# Patient Record
Sex: FEMALE | Race: WHITE | NOT HISPANIC OR LATINO | Employment: UNEMPLOYED | URBAN - METROPOLITAN AREA
[De-identification: names, ages, dates, MRNs, and addresses within clinical notes are randomized per-mention and may not be internally consistent; named-entity substitution may affect disease eponyms.]

---

## 2023-04-05 ENCOUNTER — HOSPITAL ENCOUNTER (OUTPATIENT)
Dept: RADIOLOGY | Facility: HOSPITAL | Age: 58
Discharge: HOME | End: 2023-04-05
Attending: NURSE PRACTITIONER

## 2023-04-05 DIAGNOSIS — M25.511 PAIN IN RIGHT SHOULDER: ICD-10-CM

## 2023-04-05 PROCEDURE — 73221 MRI JOINT UPR EXTREM W/O DYE: CPT | Mod: 26,RT,, | Performed by: RADIOLOGY

## 2023-04-05 PROCEDURE — 73221 MRI JOINT UPR EXTREM W/O DYE: CPT | Mod: TC,RT

## 2023-04-05 PROCEDURE — 73221 MRI SHOULDER WITHOUT CONTRAST RIGHT: ICD-10-PCS | Mod: 26,RT,, | Performed by: RADIOLOGY

## 2023-04-21 DIAGNOSIS — M25.511 ACUTE PAIN OF RIGHT SHOULDER: Primary | ICD-10-CM

## 2023-05-04 ENCOUNTER — HOSPITAL ENCOUNTER (OUTPATIENT)
Dept: RADIOLOGY | Facility: HOSPITAL | Age: 58
Discharge: HOME | End: 2023-05-04
Attending: ORTHOPAEDIC SURGERY

## 2023-05-04 ENCOUNTER — OFFICE VISIT (OUTPATIENT)
Dept: ORTHOPEDICS | Facility: CLINIC | Age: 58
End: 2023-05-04

## 2023-05-04 VITALS — RESPIRATION RATE: 16 BRPM | HEIGHT: 64 IN | WEIGHT: 208 LBS | BODY MASS INDEX: 35.51 KG/M2

## 2023-05-04 DIAGNOSIS — M25.511 ACUTE PAIN OF RIGHT SHOULDER: ICD-10-CM

## 2023-05-04 DIAGNOSIS — M25.511 ACUTE PAIN OF RIGHT SHOULDER: Primary | ICD-10-CM

## 2023-05-04 PROCEDURE — 99204 OFFICE O/P NEW MOD 45 MIN: CPT | Mod: S$PBB,25,, | Performed by: ORTHOPAEDIC SURGERY

## 2023-05-04 PROCEDURE — 20610 DRAIN/INJ JOINT/BURSA W/O US: CPT | Mod: PBBFAC,PN,RT | Performed by: ORTHOPAEDIC SURGERY

## 2023-05-04 PROCEDURE — 73030 X-RAY EXAM OF SHOULDER: CPT | Mod: TC,PN,RT

## 2023-05-04 PROCEDURE — 99213 OFFICE O/P EST LOW 20 MIN: CPT | Mod: PBBFAC,PN,25 | Performed by: ORTHOPAEDIC SURGERY

## 2023-05-04 PROCEDURE — 99204 PR OFFICE/OUTPT VISIT, NEW, LEVL IV, 45-59 MIN: ICD-10-PCS | Mod: S$PBB,25,, | Performed by: ORTHOPAEDIC SURGERY

## 2023-05-04 PROCEDURE — 73030 XR SHOULDER TRAUMA 3 VIEW RIGHT: ICD-10-PCS | Mod: 26,RT,, | Performed by: RADIOLOGY

## 2023-05-04 PROCEDURE — 99999 PR PBB SHADOW E&M-EST. PATIENT-LVL III: ICD-10-PCS | Mod: PBBFAC,,, | Performed by: ORTHOPAEDIC SURGERY

## 2023-05-04 PROCEDURE — 73030 X-RAY EXAM OF SHOULDER: CPT | Mod: 26,RT,, | Performed by: RADIOLOGY

## 2023-05-04 PROCEDURE — 20610 LARGE JOINT ASPIRATION/INJECTION: R SUBACROMIAL BURSA: ICD-10-PCS | Mod: S$PBB,RT,, | Performed by: ORTHOPAEDIC SURGERY

## 2023-05-04 PROCEDURE — 99999 PR PBB SHADOW E&M-EST. PATIENT-LVL III: CPT | Mod: PBBFAC,,, | Performed by: ORTHOPAEDIC SURGERY

## 2023-05-04 RX ORDER — MELOXICAM 15 MG/1
TABLET ORAL
COMMUNITY
Start: 2023-03-23

## 2023-05-04 RX ORDER — CHLORTHALIDONE 25 MG/1
25 TABLET ORAL
COMMUNITY
Start: 2023-04-05

## 2023-05-04 RX ORDER — SERTRALINE HYDROCHLORIDE 50 MG/1
50 TABLET, FILM COATED ORAL
COMMUNITY
Start: 2023-04-05

## 2023-05-04 RX ORDER — HYDROXYZINE HYDROCHLORIDE 50 MG/1
50 TABLET, FILM COATED ORAL NIGHTLY
COMMUNITY
Start: 2023-04-05

## 2023-05-04 RX ORDER — METHOCARBAMOL 750 MG/1
750 TABLET, FILM COATED ORAL EVERY 8 HOURS PRN
COMMUNITY
Start: 2023-04-05

## 2023-05-04 RX ORDER — TRIAMCINOLONE ACETONIDE 40 MG/ML
40 INJECTION, SUSPENSION INTRA-ARTICULAR; INTRAMUSCULAR
Status: DISCONTINUED | OUTPATIENT
Start: 2023-05-04 | End: 2023-05-04 | Stop reason: HOSPADM

## 2023-05-04 RX ADMIN — TRIAMCINOLONE ACETONIDE 40 MG: 40 INJECTION, SUSPENSION INTRA-ARTICULAR; INTRAMUSCULAR at 08:05

## 2023-05-04 NOTE — PROCEDURES
Large Joint Aspiration/Injection: R subacromial bursa    Date/Time: 5/4/2023 8:30 AM  Performed by: Golden Salinas MD  Authorized by: Golden Salinas MD     Consent Done?:  Yes (Verbal)  Indications:  Pain  Site marked: the procedure site was marked    Timeout: prior to procedure the correct patient, procedure, and site was verified    Local anesthetic: Ropivicaine.  Anesthetic total (ml):  3      Details:  Needle Size:  20 G  Ultrasonic Guidance for needle placement?: No    Approach:  Posterior  Location:  Shoulder  Site:  R subacromial bursa  Medications:  40 mg triamcinolone acetonide 40 mg/mL  Patient tolerance:  Patient tolerated the procedure well with no immediate complications

## 2023-05-04 NOTE — PROGRESS NOTES
Subjective:      Patient ID: Yelena Tabares is a 58 y.o. female.    Chief Complaint: Pain of the Right Shoulder    HPI  58-year-old female with a 4-5 year history of right shoulder pain.  She states she is had a number of injuries with it in the past.  With ongoing somewhat progressive pain recently was seen by her PCP had an MRI obtained in his referred for further evaluation.  She is had NSAIDs for this no therapy injections or other form of treatment.  Currently describing up to 6/10 level pain with strenuous use of her shoulder.  ROS      Objective:    Ortho Exam     Constitutional:   Patient is alert  and oriented in no acute distress  HEENT:  normocephalic atraumatic; PERRL EOMI  Neck:  Supple without adenopathy  Cardiovascular:  Normal rate and rhythm  Pulmonary:  Normal respiratory effort normal chest wall expansion  Abdominal:  Nonprotuberant nondistended  Musculoskeletal:  Patient has a steady nonantalgic gait.  Neck is supple with adequate range of motion  No Lhermitte's or Spurling sign.  Adequate range of motion of the right shoulder with discomfort with full forward flexion and internal rotation.  There is a mildly positive impingement sign.  Negative Yergason's and Speed's testing.  No gross weakness however pain with strength testing against resistance there is no swelling mass or atrophy noted.  There is a normal distal neurologic and vascular examination.  Neurological:  No focal defect; cranial nerves 2-12 grossly intact  Psychiatric/behavioral:  Mood and behavior normal    X-Ray Shoulder Trauma 3 view Right  Narrative: EXAMINATION:  XR SHOULDER TRAUMA 3 VIEW RIGHT    CLINICAL HISTORY:  Pain in right shoulder    TECHNIQUE:  Three or four views of the right shoulder were performed.    COMPARISON:  None    FINDINGS:  Mild glenohumeral and AC joint degenerative osteoarthrosis.  No significant soft tissue swelling.  Impression: Mild degenerative osteoarthrosis.    Electronically signed by: Jared  Orange  Date:    05/04/2023  Time:    07:52       My Findings:    I have personally reviewed radiographs and concur with above findings I additionally reviewed radiographs of her MRI consistent with a least partial tearing/thinning of the supraspinatus tendon with mild tendinosis I see no retraction.    Assessment:       Encounter Diagnosis   Name Primary?    Acute pain of right shoulder Yes         Plan:       I have discussed medical condition and treatment options with her at length.  After a verbal consent and sterile prep I injected her right shoulder today without complication with a combination of cc of Kenalog 4 cc of lidocaine.  I have suggested some physical therapy and generalized activity restrictions and continue with NSAIDs per her PCP.  Follow up with me in 6 weeks I will see her sooner if any questions or problems.        History reviewed. No pertinent past medical history.  History reviewed. No pertinent surgical history.      Current Outpatient Medications:     chlorthalidone (HYGROTEN) 25 MG Tab, Take 25 mg by mouth., Disp: , Rfl:     hydrOXYzine (ATARAX) 50 MG tablet, Take 50 mg by mouth every evening., Disp: , Rfl:     meloxicam (MOBIC) 15 MG tablet, TAKE ONE TABLET BY MOUTH EVERY DAY with food AS NEEDED THANK YOU!, Disp: , Rfl:     methocarbamoL (ROBAXIN) 750 MG Tab, Take 750 mg by mouth every 8 (eight) hours as needed., Disp: , Rfl:     sertraline (ZOLOFT) 50 MG tablet, Take 50 mg by mouth., Disp: , Rfl:     Review of patient's allergies indicates:   Allergen Reactions    Codeine Anxiety, Hives and Itching       History reviewed. No pertinent family history.  Social History     Occupational History    Not on file   Tobacco Use    Smoking status: Every Day     Types: Cigarettes    Smokeless tobacco: Never   Substance and Sexual Activity    Alcohol use: Not on file    Drug use: Not on file    Sexual activity: Not on file

## 2024-03-08 ENCOUNTER — HOSPITAL ENCOUNTER (OUTPATIENT)
Dept: RADIOLOGY | Facility: HOSPITAL | Age: 59
Discharge: HOME | End: 2024-03-08
Attending: INTERNAL MEDICINE
Payer: COMMERCIAL

## 2024-03-08 DIAGNOSIS — M25.519 SHOULDER PAIN: ICD-10-CM

## 2024-03-08 PROCEDURE — 73020 X-RAY EXAM OF SHOULDER: CPT | Mod: 26,RT,, | Performed by: RADIOLOGY

## 2024-03-08 PROCEDURE — 73020 X-RAY EXAM OF SHOULDER: CPT | Mod: TC,RT

## 2024-04-26 ENCOUNTER — HOSPITAL ENCOUNTER (OUTPATIENT)
Dept: RADIOLOGY | Facility: HOSPITAL | Age: 59
Discharge: HOME | End: 2024-04-26
Attending: INTERNAL MEDICINE

## 2024-04-26 DIAGNOSIS — M25.519 SHOULDER PAIN: ICD-10-CM

## 2024-04-26 DIAGNOSIS — F32.A DEPRESSION: ICD-10-CM

## 2024-04-26 DIAGNOSIS — F48.9 MENTAL AND BEHAVIORAL PROBLEM: ICD-10-CM

## 2024-04-26 DIAGNOSIS — F69 MENTAL AND BEHAVIORAL PROBLEM: ICD-10-CM

## 2024-04-26 PROCEDURE — 73020 X-RAY EXAM OF SHOULDER: CPT | Mod: 26,LT,, | Performed by: RADIOLOGY

## 2024-04-26 PROCEDURE — 73020 X-RAY EXAM OF SHOULDER: CPT | Mod: TC,LT

## 2024-04-26 PROCEDURE — 72100 X-RAY EXAM L-S SPINE 2/3 VWS: CPT | Mod: TC

## 2024-04-26 PROCEDURE — 72100 X-RAY EXAM L-S SPINE 2/3 VWS: CPT | Mod: 26,,, | Performed by: RADIOLOGY

## 2025-04-16 ENCOUNTER — HOSPITAL ENCOUNTER (OUTPATIENT)
Dept: RADIOLOGY | Facility: HOSPITAL | Age: 60
Discharge: HOME | End: 2025-04-16
Payer: COMMERCIAL

## 2025-04-16 DIAGNOSIS — Z12.31 ENCOUNTER FOR SCREENING MAMMOGRAM FOR MALIGNANT NEOPLASM OF BREAST: ICD-10-CM

## 2025-04-16 PROCEDURE — 77067 SCR MAMMO BI INCL CAD: CPT | Mod: TC

## 2025-04-16 PROCEDURE — 77063 BREAST TOMOSYNTHESIS BI: CPT | Mod: 26,,, | Performed by: RADIOLOGY

## 2025-04-16 PROCEDURE — 77067 SCR MAMMO BI INCL CAD: CPT | Mod: 26,,, | Performed by: RADIOLOGY

## 2025-04-18 ENCOUNTER — HOSPITAL ENCOUNTER (OUTPATIENT)
Dept: RADIOLOGY | Facility: HOSPITAL | Age: 60
Discharge: HOME | End: 2025-04-18
Attending: INTERNAL MEDICINE
Payer: COMMERCIAL

## 2025-04-18 DIAGNOSIS — M25.519 SHOULDER PAIN: ICD-10-CM

## 2025-04-18 PROCEDURE — 73020 X-RAY EXAM OF SHOULDER: CPT | Mod: 26,RT,, | Performed by: RADIOLOGY

## 2025-04-18 PROCEDURE — 73020 X-RAY EXAM OF SHOULDER: CPT | Mod: TC,LT

## 2025-04-18 PROCEDURE — 73020 X-RAY EXAM OF SHOULDER: CPT | Mod: TC,RT

## 2025-04-18 PROCEDURE — 73020 X-RAY EXAM OF SHOULDER: CPT | Mod: 26,LT,, | Performed by: RADIOLOGY
